# Patient Record
Sex: FEMALE | Race: WHITE | NOT HISPANIC OR LATINO | URBAN - METROPOLITAN AREA
[De-identification: names, ages, dates, MRNs, and addresses within clinical notes are randomized per-mention and may not be internally consistent; named-entity substitution may affect disease eponyms.]

---

## 2017-02-17 ENCOUNTER — GENERIC CONVERSION - ENCOUNTER (OUTPATIENT)
Dept: OTHER | Facility: OTHER | Age: 82
End: 2017-02-17

## 2017-02-20 ENCOUNTER — GENERIC CONVERSION - ENCOUNTER (OUTPATIENT)
Dept: OTHER | Facility: OTHER | Age: 82
End: 2017-02-20

## 2017-02-28 ENCOUNTER — ALLSCRIPTS OFFICE VISIT (OUTPATIENT)
Dept: OTHER | Facility: OTHER | Age: 82
End: 2017-02-28

## 2017-03-06 ENCOUNTER — GENERIC CONVERSION - ENCOUNTER (OUTPATIENT)
Dept: OTHER | Facility: OTHER | Age: 82
End: 2017-03-06

## 2017-05-02 ENCOUNTER — GENERIC CONVERSION - ENCOUNTER (OUTPATIENT)
Dept: OTHER | Facility: OTHER | Age: 82
End: 2017-05-02

## 2017-05-08 ENCOUNTER — GENERIC CONVERSION - ENCOUNTER (OUTPATIENT)
Dept: OTHER | Facility: OTHER | Age: 82
End: 2017-05-08

## 2017-05-24 ENCOUNTER — ALLSCRIPTS OFFICE VISIT (OUTPATIENT)
Dept: OTHER | Facility: OTHER | Age: 82
End: 2017-05-24

## 2017-05-26 ENCOUNTER — ALLSCRIPTS OFFICE VISIT (OUTPATIENT)
Dept: OTHER | Facility: OTHER | Age: 82
End: 2017-05-26

## 2017-05-30 ENCOUNTER — ALLSCRIPTS OFFICE VISIT (OUTPATIENT)
Dept: OTHER | Facility: OTHER | Age: 82
End: 2017-05-30

## 2017-06-13 ENCOUNTER — ALLSCRIPTS OFFICE VISIT (OUTPATIENT)
Dept: OTHER | Facility: OTHER | Age: 82
End: 2017-06-13

## 2017-08-30 ENCOUNTER — ALLSCRIPTS OFFICE VISIT (OUTPATIENT)
Dept: OTHER | Facility: OTHER | Age: 82
End: 2017-08-30

## 2017-09-22 ENCOUNTER — GENERIC CONVERSION - ENCOUNTER (OUTPATIENT)
Dept: OTHER | Facility: OTHER | Age: 82
End: 2017-09-22

## 2017-09-22 DIAGNOSIS — N18.4 CHRONIC KIDNEY DISEASE, STAGE IV (SEVERE) (HCC): ICD-10-CM

## 2017-09-22 DIAGNOSIS — R21 RASH AND OTHER NONSPECIFIC SKIN ERUPTION: ICD-10-CM

## 2017-09-22 DIAGNOSIS — E78.00 PURE HYPERCHOLESTEROLEMIA: ICD-10-CM

## 2017-11-17 DIAGNOSIS — R26.9 ABNORMALITY OF GAIT AND MOBILITY: ICD-10-CM

## 2017-11-17 DIAGNOSIS — Z79.01 LONG TERM CURRENT USE OF ANTICOAGULANT: ICD-10-CM

## 2018-01-11 NOTE — PROGRESS NOTES
Assessment   1  Acute urinary tract infection (599 0) (N39 0)  2  Atrial fibrillation (427 31) (I48 91)  3  Noncompliance of patient with other medical treatment and regimen (V15 81) (Z43 19)    Plan  Acute urinary tract infection    · Start: Sulfamethoxazole-Trimethoprim 800-160 MG Oral Tablet (Bactrim DS); TAKE 1  TABLET EVERY 12 HOURS WITH FOOD   · Call [phone] if: The symptoms come back after the medications are finished ;  Status:Complete;   Done: 80VJG8530 01:53PM   · Call [phone] if: Your temperature is higher than 101F ; Status:Complete;   Done:  89OJB5289 01:53PM  Atrial fibrillation    · Start: Amiodarone HCl - 200 MG Oral Tablet; Take 1 tablet daily   · Start: Eliquis 5 MG Oral Tablet; take one tablet twice daily   · Start: Lisinopril 10 MG Oral Tablet; TAKE 1 TABLET DAILY   · Start: Metoprolol Succinate ER 50 MG Oral Tablet Extended Release 24 Hour; TAKE 1  TABLET DAILY   · Start: Spironolactone 25 MG Oral Tablet; TAKE 1 TABLET DAILY   · Avoid foods and beverages that contain caffeine ; Status:Complete;   Done: 66XJA6917  01:54PM   · Call [phone] if: You have difficulty breathing, or you are short of breath more  often ; Status:Complete;   Done: 53HQU6181 01:54PM   · Call 911 if: You feel your heart is beating too fast ; Status:Complete;   Done: 32BEW3417  01:54PM    Discussion/Summary    I left a message with cardiology that she would not spend $400 dollars for her meds and that I gave her a months supply  The patient was counseled regarding instructions for management, risk factor reductions, prognosis, impressions, risks and benefits of treatment options, importance of compliance with treatment  Possible side effects of new medications were reviewed with the patient/guardian today  The treatment plan was reviewed with the patient/guardian   The patient/guardian understands and agrees with the treatment plan      Chief Complaint  Pt exp burning during urination x 1 wk History of Present Illness  HPI: Pt has in [facility] with rapid a fib in Feb and refused follow up but today she c/o UTI  She currently is on Amiodarone and Eliquis but states she won't take it if it costs $400   Cystitis (Brief): The patient is being seen for an initial evaluation of cystitis  Symptoms:  dysuria, urinary urgency, urinary frequency and foul smelling urine  Associated symptoms:  incontinence and nocturia  The patient is not currently being treated for this problem  She has had multiple previous urinary tract infections  Atrial Fibrillation (Follow-Up): The patient presents with permanent atrial fibrillation  The treatment strategy for this patient is rate control  She states her atrial fibrillation has been stable since the last visit  Comorbid Illnesses: noncompliance  Symptoms: denies palpitations, denies chest pain, stable exercise intolerance and improved dyspnea on exertion  Associated symptoms include tendency for easy bruising  Lifestyle: Diet: She does not have a healthy diet  Weight Issues: She has weight concerns  Exercise: She does not exercise regularly  She cannot exercise due to disability  Smoking: She does not use tobacco Alcohol: She denies alcohol use  Drug Use: She denies drug use  Risks: increased risk for falling  Medications: a beta blocker and amiodarone the patient is not adherent with her medication regimen  She reports not taking the medication due to the cost  She denies medication side effects  Review of Systems    Constitutional: as noted in HPI  Cardiovascular: as noted in HPI  Respiratory: as noted in HPI  Gastrointestinal: no complaints of abdominal pain, no constipation, no nausea or diarrhea, no vomiting, no bloody stools  Genitourinary: as noted in HPI  Active Problems   1  Atrial fibrillation (427 31) (I48 91)  2  Congestive heart failure (428 0) (I50 9)  3  Edema (782 3) (R60 9)  4  Encounter for screening colonoscopy (V76 51) (Z12 11)  5  Fibromyalgia (729 1) (M79 7)  6  Flu vaccine need (V04 81) (Z23)  7  Gait disturbance (781 2) (R26 9)  8  Hypercholesterolemia (272 0) (E78 0)  9  Spinal stenosis (724 00) (M48 00)  10  Visit for screening mammogram (V76 12) (Z12 31)  11  Vitamin D deficiency (268 9) (E55 9)    Past Medical History  Active Problems And Past Medical History Reviewed: The active problems and past medical history were reviewed and updated today  Surgical History  Surgical History Reviewed: The surgical history was reviewed and updated today  Social History    · Never A Smoker   · Never Drank Alcohol  The social history was reviewed and updated today  The social history was reviewed and is unchanged  Current Meds  1  Furosemide 80 MG Oral Tablet; Take one tablet by mouth daily; Therapy: 07WSG3437 to (Evaluate:18Jun2016)  Requested for: 48Dpt9469; Last   Rx:14Tid9863 Ordered  2  Vitamin D 2000 UNIT Oral Capsule; TAKE 1 CAPSULE Daily; Therapy: 26RFT7024 to (Last Rx:20Oct2015)  Requested for: 24Rda3762 Ordered    The medication list was reviewed and updated today  Allergies   1  No Known Drug Allergies    Vitals   Recorded: 15Apr2016 09:46AM   Temperature 97 1 F, Tympanic   Heart Rate 80   Systolic 538, LUE, Sitting   Diastolic 74, LUE, Sitting   Height 5 ft 1 in   Weight 199 lb    BMI Calculated 37 6   BSA Calculated 1 89     Physical Exam    Constitutional   General appearance: Abnormal   chronically ill and obese  Eyes   Conjunctiva and lids: No swelling, erythema or discharge  Pupils and irises: Equal, round and reactive to light  Pulmonary   Respiratory effort: No increased work of breathing or signs of respiratory distress  Auscultation of lungs: Clear to auscultation  Cardiovascular   Auscultation of heart: Abnormal   irreg irreg  Examination of extremities for edema and/or varicosities: Abnormal   trace edema  Abdomen   Abdomen: Non-tender, no masses      Musculoskeletal   Gait and station: Abnormal   needs walker  Neurologic   Cranial nerves: Cranial nerves 2-12 intact  Psychiatric   Orientation to person, place, and time: Normal     Mood and affect: Abnormal   Mood and Affect: angry          Signatures   Electronically signed by : JEFFERSON Tam ; Apr 15 2016  1:57PM EST                       (Author)

## 2018-01-13 VITALS
DIASTOLIC BLOOD PRESSURE: 72 MMHG | TEMPERATURE: 97.5 F | WEIGHT: 208 LBS | OXYGEN SATURATION: 94 % | HEIGHT: 61 IN | BODY MASS INDEX: 39.27 KG/M2 | HEART RATE: 88 BPM | SYSTOLIC BLOOD PRESSURE: 124 MMHG

## 2018-01-13 VITALS — TEMPERATURE: 97.9 F | DIASTOLIC BLOOD PRESSURE: 50 MMHG | SYSTOLIC BLOOD PRESSURE: 110 MMHG

## 2018-01-13 VITALS
DIASTOLIC BLOOD PRESSURE: 70 MMHG | HEART RATE: 72 BPM | BODY MASS INDEX: 35.87 KG/M2 | HEIGHT: 61 IN | SYSTOLIC BLOOD PRESSURE: 120 MMHG | TEMPERATURE: 99.3 F | WEIGHT: 190 LBS

## 2018-01-14 VITALS
SYSTOLIC BLOOD PRESSURE: 100 MMHG | WEIGHT: 184.4 LBS | DIASTOLIC BLOOD PRESSURE: 60 MMHG | BODY MASS INDEX: 34.81 KG/M2 | HEART RATE: 72 BPM | HEIGHT: 61 IN | TEMPERATURE: 98 F

## 2018-01-14 VITALS
OXYGEN SATURATION: 95 % | HEART RATE: 64 BPM | WEIGHT: 190 LBS | SYSTOLIC BLOOD PRESSURE: 110 MMHG | HEIGHT: 61 IN | DIASTOLIC BLOOD PRESSURE: 60 MMHG | BODY MASS INDEX: 35.87 KG/M2 | TEMPERATURE: 98.4 F

## 2018-01-15 VITALS
BODY MASS INDEX: 36.06 KG/M2 | DIASTOLIC BLOOD PRESSURE: 56 MMHG | TEMPERATURE: 98.4 F | HEIGHT: 61 IN | WEIGHT: 191 LBS | HEART RATE: 64 BPM | SYSTOLIC BLOOD PRESSURE: 110 MMHG

## 2018-01-16 NOTE — MISCELLANEOUS
Assessment   1  Acute kidney injury (584 9) (N17 9)  2  Congestive heart failure (428 0) (I50 9)  3  Edema (782 3) (R60 9)  4  Noncompliance of patient with other medical treatment and regimen (V15 81) (Z91 19)  5  Urinary incontinence (788 30) (R32)  6  Anticoagulant long-term use (V58 61) (Z79 01)  7  Depression screen (V79 0) (Z13 89)    Plan  Congestive heart failure, Edema    · Renew: Furosemide 80 MG Oral Tablet (Lasix); One daily  Rx By: Calista Alonso; Dispense: 90 Days ; #:90 Tablet; Refill: 3;For: Congestive heart   failure, Edema; MARTINEZ = N; Verified Transmission to [address] [place] [address]; Last   Updated By: System, SureScripts; 2/28/2017 11:30:02 AM  Cough    · Start: Mucinex DM Maximum Strength  MG Oral Tablet Extended Release 12  Hour; TAKE 1 TABLET EVERY 12 HOURS AS NEEDED FOR CONGESTION  Rx By: Calista Alonso; Dispense: 10 Days ; #:20 Tablet Extended Release 12 Hour; Refill:   0;For: Cough; MARTINEZ = N; Verified Transmission to [facility] 3887; Last Updated   By: System, SureScripts; 2/28/2017 11:30:02 AM  Depression screen    · *VB - PHQ-9 Tool; Status:Complete;   Done: 43DXK9181 12:00AM  Performed: In Office; 21 ; Ordered; For:Depression screen; Ordered   By:Ankita Pineda; Fall from other slipping, tripping, or stumbling    · *VB - Fall Risk Assessment  (Dx Z13 89 Screen for Neurologic Disorder);  Status:Complete;   Done: 38WPF3712 12:00AM  Performed: In Office; HDJ:01HYH9334;YKAXPTV; For:Fall from other slipping, tripping, or   stumbling; Ordered By:Ankita Pineda; Health Maintenance    · * DXA BONE DENSITY SPINE HIP AND PELVIS; Status:Temporary Deferral - Pt requests  deferral;    2/28/2018  Perform:[facility]; XSB:64VFW2085;  Last Updated By:Anita De La Cruz;   2/28/2017 11:03:55 AM;Ordered; For:Health Maintenance; Ordered By:Ankita Pineda;  Paroxysmal atrial fibrillation    · Start: Warfarin Sodium 2 5 MG Oral Tablet (Coumadin); take 1 tablet every other day  Rx By: Calista Alonso; Dispense: 60 Days ; #:30 Tablet; Refill: 5;For: Paroxysmal atrial   fibrillation; MARTINEZ = N; Verified Transmission to [facility] 8098; Last Updated By:   System, SureScripts; 2/28/2017 10:58:08 AM  Urinary incontinence    · *VB - Urinary Incontinence Screen (Dx Z13 89 Screen for UI); Status:Complete;   Done:  57SVQ9065 12:00AM  Performed: In Office; BRITTON:30GQY6822;RADSXFU; For:Urinary incontinence; Ordered   By:Betsy Pineda; Discussion/Summary  Discussion Summary:   Pt had been on coumadin in the past  They never new what they were taking, did not go for regular testing and fell frequently  despite her denials   Currently [facility] is drawing blood and PT is seeing  [facility] was ok  Counseling Documentation With Imm: The patient, patient's family was counseled regarding instructions for management, risk factor reductions, prognosis, patient and family education, impressions, risks and benefits of treatment options, importance of compliance with treatment  Medication SE Review and Pt Understands Tx: Possible side effects of new medications were reviewed with the patient/guardian today  The treatment plan was reviewed with the patient/guardian  The patient/guardian understands and agrees with the treatment plan      Chief Complaint  Chief Complaint Free Text Note Form: ISAAC--Pt was in [address] [place] [address],[address]  [facility] in 2/20/207 for kidney failure and was D/C 2/23/2017      History of Present Illness  TCM Communication [facility]: The patient is being contacted for follow-up after hospitalization  She was hospitalized at [facility]  The dates of hospitalization:, date of admission: 2/20/2017, date of discharge: 2/23/2017  Diagnosis: ARF  She was discharged to home  Medications reviewed and updated today  She scheduled a follow up appointment  Communication performed and completed by   HPI: Pt was admitted to [facility] with ARF due to diuresis and was switched to coumadin   Incontinence, Urinary (Follow-Up):  The patient is being seen for follow-up of mixed incontinence  The patient reports no change in the condition  Interval symptoms:  stable urinary incontinence, stable urinary urgency and stable nocturia  Associated symptoms: no vaginal pressure, no fever and no functional decline  The patient is not currently on medication for this problem  Additional history: stable, uses pads and does not want another med   Falls, Geriatric (Brief): The patient is being seen for follow-up of a hospitalization for falls  Symptoms:  impaired balance and leg weakness    The patient presents with complaints of recurring falls starting 1 year ago  The symptoms resulted from a slipping incident  The injury occurred at home  She is currently experiencing recurring falls  Her symptoms are caused by no known event  Symptoms are improved by use of a walker  Symptoms are made worse by unassisted ambulation  Symptoms are unchanged  Previous Evaluation: Pt has had numerous falls requiring help from the local [facility] to get her up in the past year    Luckily she has had no major injuries    She has been difficult to get her to do PT and has thrown out multiple agencies  Risk Factors: impaired mobility  Associated symptoms:  impaired mobility and impaired ability to live independently, but no pain from the injury  Current treatment includes use of a walker, a falls prevention checklist and physical therapy  By report, there is fair compliance with treatment, good tolerance of treatment, fair symptom control and currently she is adhering to the Plan  Pertinent medical history includes:  hearing impairment and gait disturbance  Congestive Heart Failure (Follow-Up): The patient presents for follow-up of chronic, diastolic heart failure  Comorbid Illnesses: hypertensive heart disease and chronic kidney disease (1-4)  The patient states she has been stable with her heart failure symptoms since the last visit     Symptoms: stable lower extremity edema, stable dyspnea on exertion, stable fatigue, stable exercise intolerance, denies orthopnea and denies paroxysmal nocturnal dyspnea  Associated symptoms include no chest pain    The patient presents with complaints of < 10 pound recent 6 pounds weight gain starting about 4 months ago  Home monitoring: The patient checks her weight sporadically  Lifestyle: Diet: She consumes a diverse and healthy diet  Weight Issues: She has weight concerns  Exercise: She does not exercise regularly  Smoking: She does not use tobacco Alcohol: She denies alcohol use  Drug Use: She denies drug use  Medications: the patient is adherent with her medication regimen  Medication(s): a diuretic, an angiotensin receptor blocker, Coumadin and amiodarone HCl  Due For: electrolytes  Anticoagulation (Follow-Up): The patient is currently anticoagulated with warfarin (Coumadin) for atrial fibrillation  Interval Events: in hospital with ARF  Symptoms: Symptoms since the last visit: easy bruising, but no easy bleeding and no epistaxis  Missed warfarin doses? No    Falls Risk? Yes  The patient has had a home evaluation to reduce the risk of falls  Atrial Fibrillation (Follow-Up): The patient presents with paroxysmal atrial fibrillation  The treatment strategy for this patient is rhythm control  She states her atrial fibrillation has been stable since the last visit  Symptoms: denies palpitations, denies chest pain, stable exercise intolerance, stable dyspnea on exertion and denies dizziness  Has always bee a problem  Risks: increased risk for falling  Medications: a beta blocker and amiodarone   Disease Management: the patient is doing well with her goals  Patient Health Questionnaire:   1  Little or pleasure in doing things: several days (1 Point)  2  Feeling down, depressed, or hopeless: not at all (0 Points)  3  Trouble falling or staying asleep, or sleeping to much: not at all (0 Points)     4  Feeling tired or having little energy: not at all (0 Points)  5  Poor appetite or overeating: not at all (0 Points)  6  Feeling bad about yourself, or feel like a failure, or have let yourself or your family down: several days (1 Point)  7  Trouble concentrating on things, such as reading the newspaper or watching television: not at all (0 Points)  8  Moving or speaking slowly or being fidgety: not at all (0 Points)  9  Thoughts that you would be better off dead or of hurting yourself in some way: not at all (0 Points)  Total Score: 2 Points  Review of Systems  Complete-Female:   Constitutional: No fever, no chills, feels well, no tiredness, no recent weight gain or weight loss  Cardiovascular: as noted in HPI  Respiratory: as noted in HPI  Gastrointestinal: No complaints of abdominal pain, no constipation, no nausea or vomiting, no diarrhea, no bloody stools  Genitourinary: as noted in HPI  Musculoskeletal: arthralgias, but as noted in HPI  Integumentary: No complaints of skin rash or lesions, no itching, no skin wounds, no breast pain or lump  Neurological: as noted in HPI  Psychiatric: as noted in HPI  Endocrine: No complaints of proptosis, no hot flashes, no muscle weakness, no deepening of the voice, no feelings of weakness  Hematologic/Lymphatic: a tendency for easy bruising  Active Problems   1  Anticoagulant long-term use (V58 61) (Z79 01)  2  Benign essential hypertension (401 1) (I10)  3  Congestive heart failure (428 0) (I50 9)  4  Cough due to ACE inhibitor (786 2,E942 6) (R05,T46 4X5A)  5  Edema (782 3) (R60 9)  6  Encounter for screening colonoscopy (V76 51) (Z12 11)  7  Fall from other slipping, tripping, or stumbling (E885 9) (W01  0XXA)  8  Fibromyalgia (729 1) (M79 7)  9  Gait disturbance (781 2) (R26 9)  10  Hypercholesterolemia (272 0) (E78 00)  11  Noncompliance of patient with other medical treatment and regimen (V15 81) (Z91 19)  12   Paroxysmal atrial fibrillation (427 31) (I48 0)  13  Spinal stenosis (724 00) (M48 00)  14  Visit for screening mammogram (V76 12) (Z12 31)  15  Vitamin D deficiency (268 9) (E55 9)    Past Medical History   1  History of Breast Cancer (V10 3)  2  History of Elbow pain, unspecified laterality (719 42) (M25 529)  3  Flu vaccine need (V04 81) (Z23)  4  History of H/O: hysterectomy (V88 01) (Z90 710)  5  History of Other specified pre-operative examination (V72 83) (Z01 818)  6  History of Post-menopausal bleeding (627 1) (N95 0)  7  History of Rib Fracture (807 00)    Surgical History   1  History of Breast Surgery Mastectomy  2  History of Cataract Surgery  3  History of Knee Replacement  4  History of Total Abdominal Hysterectomy With Removal Of Both Ovaries  Surgical History Reviewed: The surgical history was reviewed and updated today  Family History  Mother   1  Family history of Unknown cause of morbidity or mortality  Father   2  Family history of Unknown cause of morbidity or mortality    Social History    · Never A Smoker   · Never Drank Alcohol  Social History Reviewed: The social history was reviewed and updated today  The social history was reviewed and is unchanged  Current Meds  1  Amiodarone HCl - 200 MG Oral Tablet; take one tablet by mouth every day; Therapy: 76Xux7051 to (Evaluate:87Fih1852)  Requested for: 26Jan2017; Last   Rx:26Jan2017 Ordered  2  Furosemide 80 MG Oral Tablet; One daily; Therapy: 53ZWH6368 to (Evaluate:17Aug2017)  Requested for: 88Tzl6233; Last   Rx:80Fts2394 Ordered  3  Losartan Potassium 50 MG Oral Tablet; TAKE 1 TABLET DAILY; Therapy: 54PRW5014 to (Curt Esquivel)  Requested for: 26BTA2970; Last   Rx:97Zqw2364 Ordered  4  Metoprolol Succinate ER 50 MG Oral Tablet Extended Release 24 Hour; take one tablet   by mouth every day; Therapy: 22Gpr7238 to (Evaluate:88Xqc7312)  Requested for: 26Jan2017; Last   Rx:26Jan2017 Ordered  5  Vitamin D 2000 UNIT Oral Capsule; TAKE 1 CAPSULE Daily;    Therapy: 37EKI9494 to (Last JV:91NUV9652)  Requested for: 59Czz1173 Ordered  Medication List Reviewed: The medication list was reviewed and updated today  Allergies   1  ACE Inhibitors    Vitals  Signs   Recorded: 67Orb9118 10:54AM   Temperature: 97 5 F, Tympanic  Heart Rate: 88  Systolic: 360, RUE, Sitting  Diastolic: 72, RUE, Sitting  Height: 5 ft 1 in  Weight: 208 lb   BMI Calculated: 39 3  BSA Calculated: 1 92  O2 Saturation: 94    Physical Exam    Constitutional   General appearance: No acute distress, well appearing and well nourished  Eyes   Conjunctiva and lids: No swelling, erythema or discharge  Pupils and irises: Equal, round and reactive to light  Ears, Nose, Mouth, and Throat   External inspection of ears and nose: Normal     Nasal mucosa, septum, and turbinates: Normal without edema or erythema  Pulmonary   Respiratory effort: Abnormal   cough  Auscultation of lungs: Clear to auscultation  Cardiovascular   Auscultation of heart: Normal rate and rhythm, normal S1 and S2, without murmurs  Examination of extremities for edema and/or varicosities: Abnormal   chronic 1/4 edema  Abdomen   Abdomen: Non-tender, no masses  Skin   Skin and subcutaneous tissue: Normal without rashes or lesions  Neurologic   Cranial nerves: Cranial nerves 2-12 intact  Psychiatric   Orientation to person, place, and time: Normal   minimental 26/30  Mood and affect: Normal          Future Appointments    Date/Time Provider Specialty Site   06/20/2017 01:30 PM Everette Severs, M D   Internal [address] [facility]     Signatures   Electronically signed by : JEFFERSON Cabrales ; Feb 28 2017  1:05PM EST                       (Author)    Electronically signed by : JEFFERSON Cabrales ; Mar 27 2017 10:02AM EST                       (Author)    Electronically signed by : JEFFERSON Cabrales ; Mar 27 2017 10:02AM EST                       (Author)

## 2018-01-22 VITALS
TEMPERATURE: 98.4 F | SYSTOLIC BLOOD PRESSURE: 118 MMHG | HEIGHT: 61 IN | WEIGHT: 187 LBS | BODY MASS INDEX: 35.3 KG/M2 | HEART RATE: 68 BPM | DIASTOLIC BLOOD PRESSURE: 60 MMHG

## 2018-04-04 RX ORDER — METOPROLOL SUCCINATE 50 MG/1
TABLET, EXTENDED RELEASE ORAL
Qty: 30 TABLET | Refills: 3 | OUTPATIENT
Start: 2018-04-04